# Patient Record
Sex: MALE
[De-identification: names, ages, dates, MRNs, and addresses within clinical notes are randomized per-mention and may not be internally consistent; named-entity substitution may affect disease eponyms.]

---

## 2021-12-29 ENCOUNTER — HOSPITAL ENCOUNTER (OUTPATIENT)
Dept: HOSPITAL 97 - ER | Age: 66
Setting detail: OBSERVATION
LOS: 1 days | Discharge: HOME | End: 2021-12-30
Attending: HOSPITALIST | Admitting: HOSPITALIST
Payer: COMMERCIAL

## 2021-12-29 VITALS — OXYGEN SATURATION: 99 %

## 2021-12-29 VITALS — BODY MASS INDEX: 35.6 KG/M2

## 2021-12-29 DIAGNOSIS — Z82.3: ICD-10-CM

## 2021-12-29 DIAGNOSIS — R07.9: ICD-10-CM

## 2021-12-29 DIAGNOSIS — E05.00: ICD-10-CM

## 2021-12-29 DIAGNOSIS — R00.1: Primary | ICD-10-CM

## 2021-12-29 DIAGNOSIS — Z82.49: ICD-10-CM

## 2021-12-29 DIAGNOSIS — Z84.1: ICD-10-CM

## 2021-12-29 DIAGNOSIS — I48.0: ICD-10-CM

## 2021-12-29 DIAGNOSIS — I10: ICD-10-CM

## 2021-12-29 DIAGNOSIS — E03.2: ICD-10-CM

## 2021-12-29 DIAGNOSIS — Z83.6: ICD-10-CM

## 2021-12-29 DIAGNOSIS — Z83.3: ICD-10-CM

## 2021-12-29 DIAGNOSIS — Z20.822: ICD-10-CM

## 2021-12-29 LAB
ALBUMIN SERPL BCP-MCNC: 3.8 G/DL (ref 3.4–5)
ALP SERPL-CCNC: 120 U/L (ref 45–117)
ALT SERPL W P-5'-P-CCNC: 34 U/L (ref 12–78)
AST SERPL W P-5'-P-CCNC: 16 U/L (ref 15–37)
BUN BLD-MCNC: 17 MG/DL (ref 7–18)
GLUCOSE SERPLBLD-MCNC: 107 MG/DL (ref 74–106)
HCT VFR BLD CALC: 44.9 % (ref 39.6–49)
HDLC SERPL-MCNC: 35 MG/DL (ref 40–60)
INR BLD: 1.05
LDLC SERPL CALC-MCNC: 126 MG/DL (ref ?–130)
LYMPHOCYTES # SPEC AUTO: 1.3 K/UL (ref 0.7–4.9)
MAGNESIUM SERPL-MCNC: 2.2 MG/DL (ref 1.8–2.4)
NT-PROBNP SERPL-MCNC: 162 PG/ML (ref ?–125)
PMV BLD: 9.2 FL (ref 7.6–11.3)
POTASSIUM SERPL-SCNC: 4 MMOL/L (ref 3.5–5.1)
RBC # BLD: 5.24 M/UL (ref 4.33–5.43)
TROPONIN (EMERG DEPT USE ONLY): < 0.02 NG/ML (ref 0–0.04)
TROPONIN I: < 0.02 NG/ML (ref 0–0.04)

## 2021-12-29 PROCEDURE — 83735 ASSAY OF MAGNESIUM: CPT

## 2021-12-29 PROCEDURE — 80076 HEPATIC FUNCTION PANEL: CPT

## 2021-12-29 PROCEDURE — 71045 X-RAY EXAM CHEST 1 VIEW: CPT

## 2021-12-29 PROCEDURE — 80061 LIPID PANEL: CPT

## 2021-12-29 PROCEDURE — 99285 EMERGENCY DEPT VISIT HI MDM: CPT

## 2021-12-29 PROCEDURE — 85610 PROTHROMBIN TIME: CPT

## 2021-12-29 PROCEDURE — 85025 COMPLETE CBC W/AUTO DIFF WBC: CPT

## 2021-12-29 PROCEDURE — 36415 COLL VENOUS BLD VENIPUNCTURE: CPT

## 2021-12-29 PROCEDURE — 93306 TTE W/DOPPLER COMPLETE: CPT

## 2021-12-29 PROCEDURE — 84484 ASSAY OF TROPONIN QUANT: CPT

## 2021-12-29 PROCEDURE — 84443 ASSAY THYROID STIM HORMONE: CPT

## 2021-12-29 PROCEDURE — 80048 BASIC METABOLIC PNL TOTAL CA: CPT

## 2021-12-29 PROCEDURE — 83880 ASSAY OF NATRIURETIC PEPTIDE: CPT

## 2021-12-29 PROCEDURE — 93005 ELECTROCARDIOGRAM TRACING: CPT

## 2021-12-29 RX ADMIN — Medication SCH ML: at 21:40

## 2021-12-29 NOTE — EDPHYS
Physician Documentation                                                                           

 [facility]                                                                 

Name: Vinay Torres                                                                               

Age: 66 yrs                                                                                       

Sex: Male                                                                                         

: 1955                                                                                   

MRN: [id number]                                                                                   

Arrival Date: 2021                                                                          

Time: 09:08                                                                                       

Account#: [id number]                                                                            

Bed 24                                                                                            

Private MD:                                                                                       

ED Physician Ruben Rutherford                                                                       

HPI:                                                                                              

                                                                                             

09:45 This 66 yrs old Male presents to ER via Ambulatory with complaints of Chest Pain > 30   cp  

      y/o.                                                                                        

09:45 The patient presents with a history of heart racing.                                    cp  

09:45 Onset: The symptoms/episode began/occurred this morning, about 0630. Duration: The      cp  

      patient or guardian reports a single episode, that is still ongoing, but improving. The     

      pain does not radiate. Associated signs and symptoms: Pertinent positives: chest            

      tightness, Pertinent negatives: cough, fever, SOB, syncope, vomiting. Severity of pain:     

      in the emergency department the pain has improved.                                          

09:45 Patient reports taking prescribed Metoprolol and aspirin pta.                           cp  

                                                                                                  

Historical:                                                                                       

- Allergies:                                                                                      

09:37 No Known Allergies;                                                                     vg1 

- Home Meds:                                                                                      

09:37 [place] Thyroid Oral [Active]; Vitamin B-12 sublingual [Active]; Vitamin D3 Oral         vg1 

      [Active]; Metoprolol Tartrate Oral [Active];                                                

09:39 Lexapro Oral [Active];                                                                  vg1 

- PMHx:                                                                                           

09:37 graves disease; Hypertension; Hypothyroidism; Atrial fibrillation;                      vg1 

- PSHx:                                                                                           

09:37 Knee-Left; Shoulder-Right; Hernia repair;                                               vg1 

                                                                                                  

- Immunization history:: Client reports receiving the 2nd dose of the Covid vaccine.              

- Social history:: Smoking status: Patient denies any tobacco usage or history of.                

                                                                                                  

                                                                                                  

ROS:                                                                                              

09:50 Constitutional: Negative for body aches, chills, fever, poor PO intake.                 cp  

09:50 Cardiovascular: Positive for chest pain, palpitations, Negative for edema.              cp  

09:50 Eyes: Negative for injury, pain, redness, and discharge.                                cp  

09:50 ENT: Negative for ear pain, sore throat, difficulty swallowing, difficulty handling         

      secretions.                                                                                 

09:50 Respiratory: Negative for cough, shortness of breath, wheezing.                             

09:50 Abdomen/GI: Negative for abdominal pain, nausea, vomiting, and diarrhea.                    

09:50 Back: Negative for pain at rest, pain with movement.                                        

09:50 : Negative for urinary symptoms.                                                          

09:50 Neuro: Negative for altered mental status, dizziness, headache, syncope, weakness.          

09:50 All other systems are negative.                                                             

                                                                                                  

Exam:                                                                                             

09:40 ECG was reviewed by the Attending Physician.                                            cp  

09:55 Constitutional: The patient appears in no acute distress, alert, awake,                 cp  

      non-diaphoretic, non-toxic, well developed, well nourished.                                 

09:55 Head/Face:  Normocephalic, atraumatic.                                                  cp  

09:55 Eyes: Periorbital structures: appear normal, Conjunctiva: normal, no exudate, no            

      injection, Sclera: no appreciated abnormality, Lids and lashes: appear normal,              

      bilaterally.                                                                                

09:55 ENT: External ear(s): are unremarkable, Nose: is normal, Mouth: Lips: moist, Oral           

      mucosa: pink and intact, moist, Posterior pharynx: Airway: no evidence of obstruction,      

      patent.                                                                                     

09:55 Neck: ROM/movement: is normal, is supple, without pain, no range of motions limitations.    

09:55 Chest/axilla: Inspection: normal, Palpation: is normal, no crepitus, no tenderness.         

09:55 Cardiovascular: Rate: bradycardic, Rhythm: regular, Edema: is not appreciated, JVD: is      

      not appreciated.                                                                            

09:55 Respiratory: the patient does not display signs of respiratory distress,  Respirations:     

      normal, no use of accessory muscles, no retractions, labored breathing, is not present,     

      Breath sounds: are clear throughout, no decreased breath sounds, no stridor, no             

      wheezing.                                                                                   

09:55 Abdomen/GI: Inspection: abdomen appears normal, Bowel sounds: active, all quadrants,        

      Palpation: abdomen is soft and non-tender, in all quadrants.                                

09:55 Back: pain, is absent, ROM is normal.                                                       

                                                                                                  

Vital Signs:                                                                                      

09:34  / 77; Pulse 50; Resp 20; Temp 97.1; Pulse Ox 100% ; Weight 113.4 kg; Height 5    vg1 

      ft. 10 in. (177.80 cm); Pain 4/10;                                                          

16:02  / 68; Pulse 43; Resp 17 S; Pulse Ox 100% on R/A; Pain 0/10;                      jl7 

20:31  / 65; Pulse 66; Resp 17; Pulse Ox 99% ;                                          cg  

09:34 Body Mass Index 35.87 (113.40 kg, 177.80 cm)                                            vg1 

                                                                                                  

MDM:                                                                                              

14:13 Patient medically screened.                                                             cp  

14:22 The patient was not given aspirin in the Emergency Department. Patient reports taking   cp  

      aspirin within the past 24 hours. ED course: Patient denies any current chest pain.         

16:15 Data reviewed: vital signs, nurses notes, lab test result(s), EKG, radiologic studies,  cp  

      plain films.                                                                                

16:15 Differential diagnosis: abnormal EKG, acute myocardial infarction, arrythmia, stable    cp  

      angina, unstable angina. Test interpretation: by ED physician or midlevel provider:         

      ECG, plain radiologic studies. ED course: VS noted. Patient observed to have HR at 40       

      bpm on monitor. No current complaints of chest pain. Will admit for observation after       

      consult with DR Dupree.                                                                     

                                                                                                  

                                                                                             

09:38 Order name: Basic Metabolic Panel                                                         

                                                                                             

09:38 Order name: CBC with Diff; Complete Time: 10:17                                           

                                                                                             

11:04 Interpretation: Reviewed.                                                                 

                                                                                             

09:38 Order name: LFT's                                                                         

                                                                                             

09:38 Order name: Magnesium                                                                     

                                                                                             

09:38 Order name: NT PRO-BNP; Complete Time: 11:03                                              

                                                                                             

11:04 Interpretation: NT PRO-; Reviewed.                                                 

                                                                                             

09:38 Order name: PT-INR; Complete Time: 10:17                                                  

                                                                                             

09:38 Order name: Troponin (emerg Dept Use Only); Complete Time: 11:03                          

                                                                                             

11:05 Interpretation: Reviewed.                                                                 

                                                                                             

09:39 Order name: Basic Metabolic Panel; Complete Time: 11:03                                 EDMS

                                                                                             

11:03 Interpretation: Normal except: ; GLUC 107; GFR 62.                                  

                                                                                             

09:39 Order name: Liver (Hepatic) Function; Complete Time: 11:03                              EDMS

                                                                                             

11:03 Interpretation: Normal except: ; GLOB 3.8; A/G 1.0.                                

                                                                                             

09:39 Order name: Magnesium; Complete Time: 11:03                                             EDMS

                                                                                             

15:43 Order name: Troponin (emerg Dept Use Only)                                                

                                                                                             

16:37 Order name: SARS-COV-2 RT PCR (Document "Date of Onset" if Symptomatic)                 [facility] 

                                                                                             

16:37 Order name: SARS-COV-2 RT PCR                                                           EDMS

                                                                                             

09:30 Order name: EKG; Complete Time: 09:30                                                   vg1 

                                                                                             

09:30 Order name: EKG - Nurse/Tech; Complete Time: 09:48                                      vg1 

                                                                                             

09:38 Order name: XRAY Chest (1 view); Complete Time: 11:03                                   cp  

                                                                                             

11:05 Interpretation: Report reviewed.                                                          

                                                                                             

09:38 Order name: IV Saline Lock; Complete Time: 09:48                                        cp  

                                                                                             

09:38 Order name: Labs collected and sent; Complete Time: 09:48                               cp  

                                                                                             

09:38 Order name: O2 Per Protocol; Complete Time: 09:48                                       cp  

                                                                                             

09:38 Order name: O2 Sat Monitoring; Complete Time: 09:48                                     cp  

                                                                                             

15:43 Order name: EKG; Complete Time: 15:44                                                   cp  

                                                                                             

15:43 Order name: EKG - Nurse/Tech; Complete Time: 15:53                                      cp  

                                                                                             

15:44 Order name: Vital Signs; Complete Time: 16:03                                           cp  

                                                                                             

16:32 Order name: Diet Heart Healthy; Complete Time: 16:33                                    cp  

                                                                                                  

EC:40 Rate is 47 beats/min. Rhythm is regular. [place] interval is normal. QRS interval is normal. cp  

      QT interval is normal. Interpreted by me. Reviewed by me.                                   

                                                                                                  

Administered Medications:                                                                         

14:20 CANCELLED (Physician Discretion): Aspirin Chewable Tablet 324 mg PO once; 81 mg tablets cp  

      x 4                                                                                         

                                                                                                  

                                                                                                  

Disposition:                                                                                      

                                                                                             

08:13 Co-signature as Attending Physician, Ruben Rutherford MD I agree with the assessment and   kdr 

      plan of care.                                                                               

                                                                                                  

Disposition Summary:                                                                              

21 16:17                                                                                    

Hospitalization Ordered                                                                           

      Hospitalization Status: Observation                                                     cp  

      Provider: Britton Dupree cp  

      Location: Telemetry/MedSurg (observation)                                               cp  

      Condition: Stable                                                                       cp  

      Problem: new                                                                            cp  

      Symptoms: have improved                                                                 cp  

      Bed/Room Type: Standard                                                                 cp  

      Room Assignment: 213(21 19:57)                                                    cg  

      Diagnosis                                                                                   

        - Chest pain, unspecified                                                             cp  

        - Bradycardia, unspecified                                                            cp  

      Forms:                                                                                      

        - Medication Reconciliation Form                                                      cp  

        - SBAR form                                                                           cp  

Signatures:                                                                                       

Dispatcher [employer]                           EDMS                                                 

Ruben Rutherford MD MD kdr Page, Corey, PA PA   cp                                                   

Jade Matos RN RN cg Garcia, Victoria, RN                    RN   vg1                                                  

                                                                                                  

Corrections: (The following items were deleted from the chart)                                    

12/29                                                                                             

14:20 13:17 TROPONIN (EMERG DEPT USE ONLY)+[id number] ordered. EDMS                            EDMS

14: 14:14 Aspirin Chewable Tablet 324 mg PO once; 81 mg tablets x 4 ordered. cp             cp  

14:22 09:45 The patient or guardian reports chest pain that is located primarily in the       cp  

      anterior chest wall, cp                                                                     

19:57 16:17 cp                                                                                cg  

                                                                                                  

**************************************************************************************************

## 2021-12-29 NOTE — RAD REPORT
EXAM DESCRIPTION:  RAD - Chest Single View - 12/29/2021 10:49 am

 

CLINICAL HISTORY:  CHEST PAIN

 

COMPARISON:  February 2018

 

TECHNIQUE:  AP portable chest image was obtained 12/29/2021 10:49 am .

 

FINDINGS:  No peripheral mass or consolidation. Minimally prominent interstitial pattern matches comp
arison. No acute failure or volume overload. Heart and vasculature are normal. No measurable pleural 
effusion and no pneumothorax. No acute bony abnormality seen. No acute aortic findings suspected.

 

IMPRESSION:  No acute cardiopulmonary process.

 

No significant change from comparison study.

## 2021-12-29 NOTE — ER
Nurse's Notes                                                                                     

 [facility]                                                                 

Name: Vinay Torres                                                                               

Age: 66 yrs                                                                                       

Sex: Male                                                                                         

: 1955                                                                                   

MRN: [id number]                                                                                   

Arrival Date: 2021                                                                          

Time: 09:08                                                                                       

Account#: [id number]                                                                            

Bed 24                                                                                            

Private MD:                                                                                       

Diagnosis: Chest pain, unspecified;Bradycardia, unspecified                                       

                                                                                                  

Presentation:                                                                                     

                                                                                             

09:34 Chief complaint: Patient states: Midsternal chest pain that does not radiate began this vg1 

      morning around 0630; denies NV or h/a, states shortness of breath. States took '4 baby      

      aspirin this morning around 0830'. Coronavirus screen: Vaccine status: Patient reports      

      receiving the 2nd dose of the covid vaccine. Client denies travel out of the [place] in        

      the last 14 days. Ebola Screen: Patient negative for fever greater than or equal to         

      101.5 degrees Fahrenheit, and additional compatible Ebola Virus Disease symptoms.           

      Initial Sepsis Screen: Does the patient meet any 2 criteria? No. Patient's initial          

      sepsis screen is negative. Does the patient have a suspected source of infection? No.       

      Patient's initial sepsis screen is negative. Risk Assessment: Do you want to hurt           

      yourself or someone else? Patient reports no desire to harm self or others. Onset of        

      symptoms was 2021.                                                             

09:34 Method Of Arrival: Ambulatory                                                           vg1 

09:34 Acuity: REDD 2                                                                           vg1 

                                                                                                  

Triage Assessment:                                                                                

09:37 General: Appears in no apparent distress. uncomfortable, Behavior is calm, cooperative. vg1 

      Pain: Complains of pain in mid-sternal area Pain does not radiate. Cardiovascular:          

      Patient's skin is warm and dry.                                                             

                                                                                                  

Historical:                                                                                       

- Allergies:                                                                                      

09:37 No Known Allergies;                                                                     vg1 

- Home Meds:                                                                                      

09:37 [place] Thyroid Oral [Active]; Vitamin B-12 sublingual [Active]; Vitamin D3 Oral         vg1 

      [Active]; Metoprolol Tartrate Oral [Active];                                                

09:39 Lexapro Oral [Active];                                                                  vg1 

- PMHx:                                                                                           

09:37 graves disease; Hypertension; Hypothyroidism; Atrial fibrillation;                      vg1 

- PSHx:                                                                                           

09:37 Knee-Left; Shoulder-Right; Hernia repair;                                               vg1 

                                                                                                  

- Immunization history:: Client reports receiving the 2nd dose of the Covid vaccine.              

- Social history:: Smoking status: Patient denies any tobacco usage or history of.                

                                                                                                  

                                                                                                  

Screenin:02 Abuse screen: Denies threats or abuse. Denies injuries from another. Nutritional        jl7 

      screening: No deficits noted. Tuberculosis screening: No symptoms or risk factors           

      identified. Fall Risk IV access (20 points). Total Romero Fall Scale indicates No Risk       

      (0-24 pts).                                                                                 

                                                                                                  

Assessment:                                                                                       

16:02 Reassessment: Patient appears in no apparent distress at this time. Patient and/or      jl7 

      family updated on plan of care and expected duration. Pain level reassessed. Patient is     

      alert, oriented x 3, equal unlabored respirations, skin warm/dry/pink. Patient denies       

      pain at this time.                                                                          

                                                                                                  

Vital Signs:                                                                                      

09:34  / 77; Pulse 50; Resp 20; Temp 97.1; Pulse Ox 100% ; Weight 113.4 kg; Height 5    vg1 

      ft. 10 in. (177.80 cm); Pain 4/10;                                                          

16:02  / 68; Pulse 43; Resp 17 S; Pulse Ox 100% on R/A; Pain 0/10;                      jl7 

20:31  / 65; Pulse 66; Resp 17; Pulse Ox 99% ;                                          cg  

09:34 Body Mass Index 35.87 (113.40 kg, 177.80 cm)                                            vg1 

                                                                                                  

ED Course:                                                                                        

09:08 Patient arrived in ED.                                                                  am2 

09:37 Triage completed.                                                                       vg1 

09:37 Malcolm Arteaga PA is PHCP.                                                                cp  

09:37 Ruben Rutherford MD is Attending Physician.                                              cp  

09:37 Arm band placed on. EKG completed in triage. Results shown to MD.                       vg1 

09:48 Basic Metabolic Panel Sent.                                                             jl7 

09:48 LFT's Sent.                                                                             jl7 

09:48 Magnesium Sent.                                                                         jl7 

09:49 Initial lab(s) drawn, by me, sent to lab. Inserted saline lock: 20 gauge in right       jl7 

      wrist, using aseptic technique. Blood collected. Patient maintains SpO2 saturation          

      greater than 95% on room air.                                                               

10:49 XRAY Chest (1 view) In Process Unspecified.                                             EDMS

15:53 Michelle Mobley, RN is Primary Nurse.                                                      jl7 

16:02 Patient has correct armband on for positive identification. Bed in low position. Call   [facility] 

      light in reach. Side rails up X 1. Cardiac monitor on. Pulse ox on. NIBP on.                

16:16 Britton Dupree is Hospitalizing Provider.                                               cp  

17:31 COVID swab sent to lab.                                                                 jl7 

                                                                                                  

Administered Medications:                                                                         

14:20 CANCELLED (Physician Discretion): Aspirin Chewable Tablet 324 mg PO once; 81 mg tablets cp  

      x 4                                                                                         

                                                                                                  

                                                                                                  

Outcome:                                                                                          

16:17 Decision to Hospitalize by Provider.                                                    cp  

20:52 Patient left the ED.                                                                    bb  

                                                                                                  

Signatures:                                                                                       

Dispatcher [employer]                           EDMS                                                 

Tonya Sullivan RN                     RN   bb                                                   

Malcolm Arteaga PA                         PA   cp                                                   

Jade Matos, RN                       RN                                                      

Michelle Mobley RN                        RN   jl7                                                  

Dottie Hamlin Victoria RN                    RN   vg1                                                  

                                                                                                  

**************************************************************************************************

## 2021-12-29 NOTE — XMS REPORT
Continuity of Care Document

                          Created on:2021



Patient:WILLIAM THOMAS

Sex:Male

:1955

External Reference #:[id number]





Demographics







                          Address                   [address] [place] [address]

 

                          Home Phone                [phone]

 

                          Work Phone                [phone]

 

                          Email Address             [email or web address]

 

                          Preferred Language        English

 

                          Marital Status            Unknown

 

                          [employer] Affiliation     Unknown

 

                          Race                      Unknown

 

                          Ethnic Group              Unknown









Author







                          Organization              [facility]

t

 

                          Address                   [address]



                                                    [place] [address]

 

                          Phone                     [phone]









Support







                Name            Relationship    Address         Phone

 

                NONE            Unavailable      [place]  [phone]



                                                [place] [address] 

 

                NONE            Unavailable      [place]  [phone]



                                                [place] [address] 









Care Team Providers







                    Name                Role                Phone

 

                    Iam             Attending Clinician Unavailable









Payers







           Payer Name Policy Type Policy Number Effective Date Expiration Date S

ource







Problems

This patient has no known problems.



Allergies, Adverse Reactions, Alerts

This patient has no known allergies or adverse reactions.



Medications

This patient has no known medications.



Procedures

This patient has no known procedures.



Encounters







        Start   End     Encounter Admission Attending Care    Care    Encounter 

Source



        Date/Time Date/Time Type    Type    Clinicians Facility Department ID   

   

 

        2021         Inpatient JENNIFER      Iam HCATO   SURG    [id number]

 [facility]



        16:00:00                         [place]                 [address]   [place]



                                                                        [facility]



                                                                        dic



                                                                        Hospita



                                                                        l

 

        2021         Inpatient JENNIFER VitaleERNSTTO   SURG    [id number]

 [facility]



        13:30:00                         [place]                 [address]   [place]



                                                                        [facility]



                                                                        dic



                                                                        Hospita



                                                                        l







Results

This patient has no known results.

## 2021-12-29 NOTE — P.HP
Certification for Inpatient


Patient admitted to: Observation


With expected LOS: <2 Midnights


Practitioner: I am a practitioner with admitting privileges, knowledge of 

patient current condition, hospital course, and medical plan of care.


Services: Services provided to patient in accordance with Admission requirements

found in Title 42 Section 412.3 of the Code of Federal Regulations





Patient History


Date of Service: 12/29/21


Reason for admission: Chest tightness.


History of Present Illness: 


66-year-old gentleman with a history of atrial fibrillation on metoprolol, not 

anticoagulated presented to the emergency department with a complaint of chest 

tightness of onset this morning.  Symptoms associated with shortness of breath 

and preceded by palpitation.  Patient denies any diaphoresis or nausea.  He 

denies any abdominal pain, denies any cough.  Patient noted to be bradycardic in

the ED. EKG demonstrated sinus bradycardia, no heart block.  He is on metoprolol

25 mg twice daily and took his morning dose.  His heart rate was in the low 40s 

during my assessment in the ED. Patient currently asymptomatic.  He is placed 

under observation for ACS rule out and further assessment of the bradycardia.





Allergies





No Known Allergies Allergy (Verified 02/02/18 01:32)


   





Home Medications: 








Aspirin Chewable [Aspirin Chewable*] 81 mg PO DAILY #30 tab.chew 02/02/18 


Levothyroxine [Synthroid] 75 mcg PO [id number] #30 tab 02/02/18 


Metoprolol Tartrate 25 mg PO BID #60 tablet 02/02/18 








- Past Medical/Surgical History


Diabetic: No


-: Graves disease


-: Atrial fibrillation


-: Iatrogenic hypothyroidism


-: Hernia repair


-: hemorrhodectomy





- Family History


  ** Father


-: Heart disease





  ** Mother


-: Lung disease, Diabetes, Stroke, Kidney disease





- Social History


Alcohol use: No


CD- Drugs: No


Caffeine use: Yes





Review of Systems


Other: 


Except as documented, all other systems reviewed and negative.








Physical Examination





- Physical Exam


General: Alert, In no apparent distress, Oriented x3


HEENT: Atraumatic, PERRLA, Mucous membr. moist/pink, Sclerae nonicteric


Neck: Supple, JVD not distended


Respiratory: Clear to auscultation bilaterally, Normal air movement


Cardiovascular: No edema, Regular rate/rhythm, Normal S1 S2, No murmurs


Gastrointestinal: Normal bowel sounds, Soft and benign, Non-distended, No 

tenderness


Musculoskeletal: No swelling


Integumentary: No rashes


Neurological: Normal speech, Normal strength at 5/5 x4 extr, Cranial nerves 3-12

 intact


Lymphatics: No axilla or inguinal lymphadenopathy





- Studies


Laboratory Data (last 24 hrs)





12/29/21 09:40: PT 12.1, INR 1.05


12/29/21 09:40: WBC 6.10, Hgb 15.1, Hct 44.9, Plt Count 153


12/29/21 09:40: Sodium 141, Potassium 4.0, BUN 17, Creatinine 1.18, Glucose 107 

H, Magnesium 2.2, Total Bilirubin 0.7, AST 16, ALT 34, Alkaline Phosphatase 120 

H








Assessment and Plan





- Problems (Diagnosis)


(1) Sinus bradycardia


Current Visit: Yes   Status: Acute   





(2) Chest pain


Current Visit: Yes   Status: Acute   





(3) Hypertension


Current Visit: No   Status: Acute   





(4) Hypothyroidism


Current Visit: No   Status: Acute   





(5) Paroxysmal atrial fibrillation


Onset Date: 02/02/18   Current Visit: No   Status: Acute   





- Plan


Place patient under observation.


Trend troponin


Hold home dose metoprolol


Obtain echocardiogram


Check TSH for levothyroxine dose adjustment.


Cardiology consult.


Collect, confirm and reconcile home medications.





- Advance Directives


Does patient have a Living Will: No


Does patient have a Durable POA for Healthcare: No

## 2021-12-30 VITALS — SYSTOLIC BLOOD PRESSURE: 156 MMHG | DIASTOLIC BLOOD PRESSURE: 70 MMHG | TEMPERATURE: 97.9 F

## 2021-12-30 LAB
BUN BLD-MCNC: 24 MG/DL (ref 7–18)
GLUCOSE SERPLBLD-MCNC: 102 MG/DL (ref 74–106)
HCT VFR BLD CALC: 41.5 % (ref 39.6–49)
LYMPHOCYTES # SPEC AUTO: 1.7 K/UL (ref 0.7–4.9)
PMV BLD: 9.4 FL (ref 7.6–11.3)
POTASSIUM SERPL-SCNC: 4.3 MMOL/L (ref 3.5–5.1)
RBC # BLD: 4.8 M/UL (ref 4.33–5.43)

## 2021-12-30 RX ADMIN — Medication SCH ML: at 08:25

## 2021-12-30 NOTE — P.DS
Admission Date: 12/29/21


Discharge Date: 12/30/21


Disposition: ROUTINE DISCHARGE


Discharge Condition: FAIR


Reason for Admission: Chest tightness.


Consultations: 


Cardiology-Dr. Beck.








- Problems


(1) Sinus bradycardia


Current Visit: Yes   Status: Acute   





(2) Chest pain


Current Visit: Yes   Status: Acute   





(3) Hypertension


Current Visit: No   Status: Acute   





(4) Hypothyroidism


Current Visit: No   Status: Acute   





(5) Paroxysmal atrial fibrillation


Onset Date: 02/02/18   Current Visit: No   Status: Acute   


Brief History of Present Illness: 


66-year-old gentleman with a history of atrial fibrillation on metoprolol, not 

anticoagulated presented to the emergency department with a complaint of chest 

tightness of onset this morning.  Symptoms associated with shortness of breath 

and preceded by palpitation.  Patient denies any diaphoresis or nausea.  He 

denies any abdominal pain, denies any cough.  Patient noted to be bradycardic in

the ED. EKG demonstrated sinus bradycardia, no heart block.  He is on metoprolol

25 mg twice daily and took his morning dose.  His heart rate was in the low 40s 

during my assessment in the ED. Patient currently asymptomatic.  He is placed 

under observation for ACS rule out and further assessment of the bradycardia.





Hospital Course: 


Patient placed under observation on the medical floor.  Troponin trended 

negative.  Patient evaluated by cardiology-Dr. Beck who recommended outpatient

stress test.  Patient was still bradycardic despite holding his home dose 

metoprolol.  He was in sinus rhythm.  Echocardiogram ordered and the result is 

pending.  TSH within normal limit. ACS ruled out, vitals are stable.  Patient is

deemed stable for discharge.  He will follow with Dr. Beck as an outpatient 

for stress test.





Vital Signs/Physical Exam: 














Temp Pulse Resp BP Pulse Ox


 


 97.8 F   50   18   130/63   98 


 


 12/30/21 08:00  12/30/21 08:00  12/30/21 08:00  12/30/21 08:00  12/30/21 08:00








General: Alert, In no apparent distress, Oriented x3


HEENT: Mucous membr. moist/pink


Neck: Supple, JVD not distended


Respiratory: Clear to auscultation bilaterally, Normal air movement


Cardiovascular: No edema, Normal S1 S2, Other (Bradycardia)


Gastrointestinal: Normal bowel sounds, Soft and benign, Non-distended, No 

tenderness


Musculoskeletal: No swelling


Integumentary: No rashes


Neurological: Normal strength at 5/5 x4 extr


Laboratory Data at Discharge: 














WBC  8.10 K/uL (4.3-10.9)  D 12/30/21  04:22    


 


Hgb  13.7 g/dL (13.6-17.9)   12/30/21  04:22    


 


Hct  41.5 % (39.6-49.0)   12/30/21  04:22    


 


Plt Count  154 K/uL (152-406)   12/30/21  04:22    


 


PT  12.1 SECONDS (9.5-12.5)   12/29/21  09:40    


 


INR  1.05   12/29/21  09:40    


 


Sodium  143 mmol/L (136-145)   12/30/21  04:22    


 


Potassium  4.3 mmol/L (3.5-5.1)   12/30/21  04:22    


 


BUN  24 mg/dL (7-18)  H  12/30/21  04:22    


 


Creatinine  1.44 mg/dL (0.55-1.3)  H  12/30/21  04:22    


 


Glucose  102 mg/dL ()   12/30/21  04:22    


 


Magnesium  2.2 mg/dL (1.8-2.4)   12/29/21  09:40    


 


Total Bilirubin  0.7 mg/dL (0.2-1.0)   12/29/21  09:40    


 


AST  16 U/L (15-37)   12/29/21  09:40    


 


ALT  34 U/L (12-78)   12/29/21  09:40    


 


Alkaline Phosphatase  120 U/L ()  H  12/29/21  09:40    


 


Troponin I  < 0.02 ng/mL (0.0-0.045)   12/30/21  00:41    


 


Triglycerides  280 mg/dL (<150)  H  12/29/21  21:21    


 


Cholesterol  217 mg/dL (<200)  H  12/29/21  21:21    


 


HDL Cholesterol  35 mg/dL (40-60)  L  12/29/21  21:21    


 


Cholesterol/HDL Ratio  6.20   12/29/21  21:21    








Home Medications: 








Escitalopram [Lexapro*] 0.5 tab PO DAILY 12/29/21 


Thyroid,Pork [[place] Thyroid] 1 tab PO DAILY 12/29/21 


Aspirin [Aspirin EC 81 MG] 81 mg PO DAILY #30 tablet. 12/30/21 





New Medications: 


Aspirin [Aspirin EC 81 MG] 81 mg PO DAILY #30 tablet.


Physician Discharge Instructions: 


Please call Dr. Beck's office at [phone] for arrangement for outpatient 

stress test.


Diet: AHA


Activity: Ad maykel


Followup: 


Ritchie Benavides DO [Primary Care Provider] - 1-2 Weeks


Isael Beck MD [ACTIVE - CAN ADMIT] - 1 Week (Outpatient stress test)

## 2022-01-03 NOTE — ECHO
HEIGHT: 5 ft 10 in   WEIGHT: 248 lb 14.4 oz   DATE OF STUDY: 12/30/2021   REFER DR: 
huma bass

2-DIMENSIONAL: YES

     M.MODE: YES

 DOPPLER: YES

COLOR FLOW: YES



                    TDS:  

PORTABLE: 

 DEFINITY:  

BUBBLE STUDY: 





DIAGNOSIS:  SYMPTOMATIC BRADYCARDIA



CARDIAC HISTORY:  

CATHERIZATION: 

SURGERY: 

PROSTHETIC VALVE: 

PACEMAKER: 





MEASUREMENTS (cm)

    DIASTOLIC (NORMALS)                 SYSTOLIC (NORMALS)

IVSd                 1.1 (0.6-1.2)                    LA Diam 3.8 (1.9-4.0)     LVEF       
  60-65%  

LVIDd               5.5 (3.5-5.7)                        LVIDs      3.2 (2.0-3.5)     %FS  
        43%

LVPWd             1.2 (0.6-1.2)

Ao Diam           3.2 (2.0-3.7)



2 DIMENSIONAL ASSESSMENT:

RIGHT ATRIUM:                   NORMAL

LEFT ATRIUM:       NORMAL



RIGHT VENTRICLE:            NORMAL

LEFT VENTRICLE: NORMAL



TRICUSPID VALVE:             NORMAL

MITRAL VALVE:     MILD MITRAL REGURGITATION



PULMONIC VALVE:             NORMAL

AORTIC VALVE:     NORMAL



PERICARDIAL EFFUSION: NONE

AORTIC ROOT:      NORMAL





LEFT VENTRICULAR WALL MOTION:     NORMAL



DOPPLER/COLOR FLOW:     MILD MITRAL REGURGITATION



COMMENTS:      NORMAL LEFT VENTRICULAR EJECTION FRACTION 60-65%.  

                            NORMAL WALL MOTION.  MILD MITRAL REGURGITATION.



TECHNOLOGIST:   LOPEZ STONER

## 2023-02-09 ENCOUNTER — HOSPITAL ENCOUNTER (EMERGENCY)
Dept: HOSPITAL 97 - ER | Age: 68
Discharge: HOME | End: 2023-02-09
Payer: COMMERCIAL

## 2023-02-09 DIAGNOSIS — R00.2: Primary | ICD-10-CM

## 2023-02-09 DIAGNOSIS — R07.89: ICD-10-CM

## 2023-02-09 DIAGNOSIS — Z20.822: ICD-10-CM

## 2023-02-09 DIAGNOSIS — E03.9: ICD-10-CM

## 2023-02-09 DIAGNOSIS — I48.91: ICD-10-CM

## 2023-02-09 DIAGNOSIS — I10: ICD-10-CM

## 2023-02-09 DIAGNOSIS — Z79.82: ICD-10-CM

## 2023-02-09 LAB
BUN BLD-MCNC: 22 MG/DL (ref 7–18)
GLUCOSE SERPLBLD-MCNC: 115 MG/DL (ref 74–106)
HCT VFR BLD CALC: 43.2 % (ref 39.6–49)
LYMPHOCYTES # SPEC AUTO: 1.5 K/UL (ref 0.7–4.9)
MCV RBC: 86.1 FL (ref 80–100)
NT-PROBNP SERPL-MCNC: 22 PG/ML (ref ?–125)
PMV BLD: 8.7 FL (ref 7.6–11.3)
POTASSIUM SERPL-SCNC: 4 MMOL/L (ref 3.5–5.1)
RBC # BLD: 5.01 M/UL (ref 4.33–5.43)
TROPONIN I SERPL HS-MCNC: 7.7 PG/ML (ref ?–58.9)

## 2023-02-09 PROCEDURE — 96374 THER/PROPH/DIAG INJ IV PUSH: CPT

## 2023-02-09 PROCEDURE — 93005 ELECTROCARDIOGRAM TRACING: CPT

## 2023-02-09 PROCEDURE — 80048 BASIC METABOLIC PNL TOTAL CA: CPT

## 2023-02-09 PROCEDURE — 84484 ASSAY OF TROPONIN QUANT: CPT

## 2023-02-09 PROCEDURE — 36415 COLL VENOUS BLD VENIPUNCTURE: CPT

## 2023-02-09 PROCEDURE — 87811 SARS-COV-2 COVID19 W/OPTIC: CPT

## 2023-02-09 PROCEDURE — 99285 EMERGENCY DEPT VISIT HI MDM: CPT

## 2023-02-09 PROCEDURE — 83880 ASSAY OF NATRIURETIC PEPTIDE: CPT

## 2023-02-09 PROCEDURE — 85025 COMPLETE CBC W/AUTO DIFF WBC: CPT

## 2023-02-09 PROCEDURE — 71045 X-RAY EXAM CHEST 1 VIEW: CPT

## 2023-02-09 NOTE — RAD REPORT
EXAM DESCRIPTION:  RAD - Chest Single View - 2/9/2023 7:49 pm

 

CLINICAL HISTORY:  Chest pain

Chest pain.

 

COMPARISON:  Chest Single View dated 12/29/2021; Chest Single View dated 2/2/2018; CHEST PA AND LAT 2
 VIEW dated 7/12/2013; CHEST SINGLE VIEW dated 12/8/2011

 

FINDINGS:  Portable technique limits examination quality.

 

The lungs are grossly clear. The heart is normal in size. No displaced fractures.

 

IMPRESSION:  No acute intrathoracic process suspected.

## 2023-02-09 NOTE — EDPHYS
Physician Documentation                                                                           

 [facility]                                                                 

Name: Vinay Torres                                                                               

Age: 67 yrs                                                                                       

Sex: Male                                                                                         

: 1955                                                                                   

MRN: [id number]                                                                                   

Arrival Date: 2023                                                                          

Time: 19:07                                                                                       

Account#: [id number]                                                                            

Bed 4                                                                                             

Private MD: Ritchie Benavides ED Physician Javon Neal                                                                         

HPI:                                                                                              

                                                                                             

19:55 This 67 yrs old Male presents to ER via Ambulatory with complaints of palpitations,     rn  

      Chest Pain.                                                                                 

19:55 The patient or guardian reports chest pain that is located primarily in the chest       rn  

      diffusely. Onset: just prior to arrival. The pain does not radiate. Associated signs        

      and symptoms: Pertinent positives: palpitations, shortness of breath, Pertinent             

      negatives: abdominal pain, cough, diaphoresis, syncope, vomiting. The chest pain is         

      described as aching. Duration: The patient or guardian reports a single episode, that       

      is now resolved. Modifying factors: The symptoms are alleviated by nothing. the             

      symptoms are aggravated by nothing. Severity of pain: At its worst the pain was mild in     

      the emergency department the pain has resolved. The patient has experienced similar         

      episodes in the past. The patient has not recently seen a physician. Pt reports             

      palpitations while at home walking from shop, assoc with sob and chest pain that was        

      mild, resolved upon arrival to ER. Reports told by Dr. Pineda to stop metoprolol           

      because HR too low. Currently asymptomatic. .                                               

                                                                                                  

Historical:                                                                                       

- Allergies:                                                                                      

19:26 No Known Allergies;                                                                     mb9 

- Home Meds:                                                                                      

19:26 Lexapro Oral [Active]; levothyroxine oral [Active]; Aspirin Oral [Active];              mb9 

- PMHx:                                                                                           

19:26 Atrial fibrillation; graves disease; Hypertension; Hypothyroidism;                      mb9 

- PSHx:                                                                                           

19:26 hernia repair; Knee-Left; Shoulder-Right;                                               mb9 

                                                                                                  

- Immunization history:: Adult Immunizations up to date.                                          

- Social history:: Smoking status: Patient denies any tobacco usage or history of.                

- Family history:: not pertinent.                                                                 

- Hospitalizations: : No recent hospitalization is reported.                                      

                                                                                                  

                                                                                                  

ROS:                                                                                              

19:55 Constitutional: Negative for fever, chills, and weight loss, Eyes: Negative for injury, rn  

      pain, redness, and discharge, Neck: Negative for injury, pain, and swelling,                

      Cardiovascular: + chest pain and palpitations Respiratory: + sob Abdomen/GI: Negative       

      for abdominal pain, nausea, vomiting, diarrhea, and constipation, MS/Extremity:             

      Negative for injury and deformity, Skin: Negative for injury, rash, and discoloration,      

      Neuro: Negative for headache, weakness, numbness, tingling, and seizure.                    

                                                                                                  

Exam:                                                                                             

19:55 Constitutional:  This is a well developed, well nourished patient who is awake, alert,  rn  

      and in no acute distress. Head/Face:  Normocephalic, atraumatic. Eyes:  Periorbital         

      areas with no swelling, redness, or edema. Cardiovascular:  Regular rate and rhythm.        

      No pulse deficits. Respiratory:  No increased work of breathing, no retractions or          

      nasal flaring. Abdomen/GI:  soft, non-tender Skin:  Warm, dry MS/ Extremity:  Pulses        

      equal, no cyanosis.  Neuro:  Awake and alert, GCS 15                                        

21:11 ECG was reviewed by the Attending Physician.                                            rn  

                                                                                                  

Vital Signs:                                                                                      

19:24  / 74; Pulse 73; Resp 18; Temp 98.8; Pulse Ox 99% on R/A; Weight 117.93 kg;       mb9 

      Height 5 ft. 10 in. (177.80 cm);                                                            

20:30  / 57; Pulse 56; Resp 16; Pulse Ox 98% ;                                          vc1 

19:24 Body Mass Index 37.31 (117.93 kg, 177.80 cm)                                            mb9 

                                                                                                  

MDM:                                                                                              

19:12 Patient medically screened.                                                             rn  

20:42 Differential diagnosis: acute myocardial infarction, acute pericarditis, anxiety,       rn  

      coronary artery disease pericarditis, pneumothorax, stable angina, afib with rvr. HEART     

      Score: History: Slightly Suspicious (0), ECG: Normal (0), Age: > or = 65 years (2),         

      Risk Factors: 1 or 2 risk factors (1), Troponin: < or = 1 x Normal Limit (0), Total         

      Score = 3. Data reviewed: vital signs, nurses notes, lab test result(s), EKG,               

      radiologic studies, plain films, and as a result, I will discharge patient. Counseling:     

      I had a detailed discussion with the patient and/or guardian regarding: the historical      

      points, exam findings, and any diagnostic results supporting the discharge/admit            

      diagnosis, lab results, radiology results, the need for outpatient follow up, to return     

      to the emergency department if symptoms worsen or persist or if there are any questions     

      or concerns that arise at home. Special discussion: I discussed with the                    

      patient/guardian in detail that at this point there is no indication for admission to       

      the hospital. It is understood, however, that if the symptoms persist or worsen the         

      patient needs to return immediately for re-evaluation. Based on the history and exam        

      findings, there is no indication for further emergent testing or inpatient evaluation.      

      I discussed with the patient/guardian the need to see the cardiologist for further          

      evaluation of the symptoms. ED course: Pt resolved prior to coming in after taking          

      metoprolol at home, palpitations/CP/sob coincided together and all resolved together,       

      pointing more to afib with rvr as root cause. Trop neg here. Still asymptomatic. Will       

      dc home with f/u with Dr. Pineda. Takes aspirin daily already. .                           

                                                                                                  

                                                                                             

19:22 Order name: Basic Metabolic Panel; Complete Time: 20:36                                 rn  

                                                                                             

19:22 Order name: CBC with Diff; Complete Time: 20:03                                         rn  

                                                                                             

19:22 Order name: NT PRO-BNP; Complete Time: 20:36                                            rn  

                                                                                             

19:22 Order name: Troponin HS; Complete Time: 20:36                                           rn  

                                                                                             

19:22 Order name: XRAY Chest (1 view); Complete Time: 20:03                                   rn  

                                                                                             

19:34 Order name: SARS RAPID; Complete Time: 20:36                                            kl  

                                                                                             

19:22 Order name: EKG; Complete Time: 19:23                                                   rn  

                                                                                             

19:22 Order name: Cardiac monitoring; Complete Time: 19:37                                    rn  

                                                                                             

19:22 Order name: EKG - Nurse/Tech; Complete Time: 19:56                                      rn  

                                                                                             

19:22 Order name: IV Saline Lock; Complete Time: 19:37                                        rn  

                                                                                             

19:22 Order name: Labs collected and sent; Complete Time: 19:37                               rn  

                                                                                             

19:22 Order name: O2 Per Protocol; Complete Time: 19:37                                       rn  

                                                                                             

19:22 Order name: O2 Sat Monitoring; Complete Time: 19:37                                     rn  

                                                                                                  

EC:11 Rate is 62 beats/min. Rhythm is regular. QRS Axis is Normal. NM interval is normal. QRS rn  

      interval is normal. QT interval is normal. No Q waves. T waves are Normal. No ST            

      changes noted. Clinical impression: Normal ECG. Interpreted by me. Reviewed by me.          

                                                                                                  

Administered Medications:                                                                         

20:15 Drug: Magnesium Sulfate 1 grams Route: IVPB; Infused Over: 1 hrs; Site: left            jb4 

      antecubital;                                                                                

21:09 Follow up: Response: No adverse reaction                                                jb4 

                                                                                                  

                                                                                                  

Disposition Summary:                                                                              

23 20:44                                                                                    

Discharge Ordered                                                                                 

      Location: Home                                                                          rn  

      Problem: new                                                                            rn  

      Symptoms: are resolved                                                                  rn  

      Condition: Stable                                                                       rn  

      Diagnosis                                                                                   

        - Paroxysmal atrial fibrillation - Resolved                                           rn  

        - Palpitations                                                                        rn  

        - Chest pain, unspecified                                                             rn  

      Followup:                                                                               rn  

        - With: Private Physician                                                                  

        - When: As needed                                                                          

        - Reason: Recheck today's complaints, Re-evaluation by your physician                      

      Discharge Instructions:                                                                     

        - Discharge Summary Sheet                                                             rn  

        - Atrial Fibrillation                                                                 rn  

        - Nonspecific Chest Pain, Adult                                                       rn  

        - Palpitations                                                                        rn  

      Forms:                                                                                      

        - Medication Reconciliation Form                                                      rn  

        - Thank You Letter                                                                    rn  

        - Antibiotic Education                                                                rn  

        - Prescription Opioid Use                                                             rn  

Signatures:                                                                                       

Dispatcher [employer]                           Javon Solis MD MD rn Bryson, James RN                       RN   jb4                                                  

Kay Stratton, RN                 RN   mb9                                                  

                                                                                                  

**************************************************************************************************

## 2023-02-09 NOTE — ER
Nurse's Notes                                                                                     

 [facility]                                                                 

Name: Vinay Torres                                                                               

Age: 67 yrs                                                                                       

Sex: Male                                                                                         

: 1955                                                                                   

MRN: [id number]                                                                                   

Arrival Date: 2023                                                                          

Time: 19:07                                                                                       

Account#: [id number]                                                                            

Bed 4                                                                                             

Private MD: Ritchie Benavides                                                                        

Diagnosis: Paroxysmal atrial fibrillation-Resolved;Palpitations;Chest pain, unspecified           

                                                                                                  

Presentation:                                                                                     

                                                                                             

19:24 Chief complaint: Patient states: "I started having chest pain at 6 today. My heart rate mb9 

      went up to 110 and then down to 50, which is where i usually am". Coronavirus screen:       

      Vaccine status: Patient reports receiving the 2nd dose of the covid vaccine. Ebola          

      Screen: No symptoms or risks identified at this time. Initial Sepsis Screen: Does the       

      patient meet any 2 criteria? No. Patient's initial sepsis screen is negative. Does the      

      patient have a suspected source of infection? No. Patient's initial sepsis screen is        

      negative. Risk Assessment: Do you want to hurt yourself or someone else? Patient            

      reports no desire to harm self or others. Onset of symptoms was 2023.          

19:24 Acuity: REDD 3                                                                           mb9 

19:24 Method Of Arrival: Ambulatory                                                           mb9 

                                                                                                  

Historical:                                                                                       

- Allergies:                                                                                      

19:26 No Known Allergies;                                                                     mb9 

- Home Meds:                                                                                      

19:26 Lexapro Oral [Active]; levothyroxine oral [Active]; Aspirin Oral [Active];              mb9 

- PMHx:                                                                                           

19:26 Atrial fibrillation; graves disease; Hypertension; Hypothyroidism;                      mb9 

- PSHx:                                                                                           

19:26 hernia repair; Knee-Left; Shoulder-Right;                                               mb9 

                                                                                                  

- Immunization history:: Adult Immunizations up to date.                                          

- Social history:: Smoking status: Patient denies any tobacco usage or history of.                

- Family history:: not pertinent.                                                                 

- Hospitalizations: : No recent hospitalization is reported.                                      

                                                                                                  

                                                                                                  

Screenin:05 [facility] ED Fall Risk Assessment (Adult) History of falling in the last 3 months,       vc1 

      including since admission No falls in past 3 months (0 pts) Confusion or Disorientation     

      No (0 pts) Intoxicated or Sedated No (0 pts) Impaired Gait No (0 pts) Mobility Assist       

      Device Used No (0 pt) Altered Elimination No (0 pt) Score/Fall Risk Level 0 - 2 = Low       

      Risk Oriented to surroundings, Maintained a safe environment, Educated pt \T\ family on     

      fall prevention, incl call for assistance when getting out of bed, Assessed \T\             

      reinforced patient's understanding of fall precautions, Provided non-skid footwear.         

      Abuse screen: Denies threats or abuse. Nutritional screening: No deficits noted.            

      Tuberculosis screening: No symptoms or risk factors identified.                             

                                                                                                  

Assessment:                                                                                       

19:30 General: Appears in no apparent distress. comfortable, Behavior is calm, cooperative,   jb4 

      appropriate for age. Pain: Complains of pain in chest Pain does not radiate. Pain           

      currently is 0 out of 10 on a pain scale. Quality of pain is described as tightness.        

      Neuro: Level of Consciousness is awake, alert, obeys commands, Oriented to person,          

      place, time, situation. Cardiovascular: Patient's skin is warm and dry. Respiratory:        

      Airway is patent Respiratory effort is even, unlabored, Respiratory pattern is regular,     

      symmetrical. GI: No signs and/or symptoms were reported involving the gastrointestinal      

      system. : No signs and/or symptoms were reported regarding the genitourinary system.      

      EENT: No signs and/or symptoms were reported regarding the EENT system. Derm: Skin is       

      intact, Skin is pink, warm \T\ dry. Musculoskeletal: Circulation, motion, and sensation     

      intact. Range of motion: intact in all extremities.                                         

20:30 Reassessment: Patient appears in no apparent distress at this time. Patient and/or      jb4 

      family updated on plan of care and expected duration. Pain level reassessed. Patient is     

      alert, oriented x 3, equal unlabored respirations, skin warm/dry/pink.                      

21:07 Reassessment: Patient appears in no apparent distress at this time. Patient and/or      vc1 

      family updated on plan of care and expected duration. Pain level reassessed. Patient is     

      alert, oriented x 3, equal unlabored respirations, skin warm/dry/pink. Patient states       

      feeling better. Patient states symptoms have improved. Pain: Denies pain. Pain began        

      suddenly.                                                                                   

                                                                                                  

Vital Signs:                                                                                      

19:24  / 74; Pulse 73; Resp 18; Temp 98.8; Pulse Ox 99% on R/A; Weight 117.93 kg;       mb9 

      Height 5 ft. 10 in. (177.80 cm);                                                            

20:30  / 57; Pulse 56; Resp 16; Pulse Ox 98% ;                                          vc1 

19:24 Body Mass Index 37.31 (117.93 kg, 177.80 cm)                                            mb9 

                                                                                                  

ED Course:                                                                                        

19:07 Patient arrived in ED.                                                                  am2 

19:07 Ritchie Benavides DO is Private Physician.                                                am2 

19:12 Javon Neal MD is Attending Physician.                                                rn  

19:26 Triage completed.                                                                       mb9 

19:26 Arm band placed on.                                                                     mb9 

19:27 Placed in gown. Bed in low position. Call light in reach. Side rails up X 1. Client     mb9 

      placed on continuous cardiac and pulse oximetry monitoring. NIBP monitoring applied.        

      Cardiac monitor on.                                                                         

19:30 Initial lab(s) drawn, by me, sent to lab. Inserted saline lock: 20 gauge in left        jb4 

      antecubital area, using aseptic technique. Blood collected. Patient maintains SpO2          

      saturation greater than 95% on room air.                                                    

19:33 Freddy Correa, RN is Primary Nurse.                                                     jb4 

19:51 XRAY Chest (1 view) In Process Unspecified.                                             EDMS

19:56 SARS RAPID Sent.                                                                        jb4 

21:06 No provider procedures requiring assistance completed. IV discontinued, intact,         vc1 

      bleeding controlled, No redness/swelling at site. Pressure dressing applied.                

                                                                                                  

Administered Medications:                                                                         

20:15 Drug: Magnesium Sulfate 1 grams Route: IVPB; Infused Over: 1 hrs; Site: left            jb4 

      antecubital;                                                                                

21:09 Follow up: Response: No adverse reaction                                                jb4 

                                                                                                  

                                                                                                  

Medication:                                                                                       

21:07 VIS not applicable for this client.                                                     vc1 

                                                                                                  

Outcome:                                                                                          

20:44 Discharge ordered by MD.                                                                rn  

21:06 Discharged to home ambulatory, with significant other.                                  vc1 

21:06 Condition: good                                                                             

21:06 Discharge instructions given to patient, significant other, Instructed on discharge         

      instructions, follow up and referral plans. Demonstrated understanding of instructions,     

      follow-up care.                                                                             

21:09 Patient left the ED.                                                                    vc1 

                                                                                                  

Signatures:                                                                                       

Dispatcher [employer]                           EDMS                                                 

Javon Neal MD MD rn Bryson, James, RN                       RN   jb4                                                  

Dottie Hamlin                               am2                                                  

Corinne Austin RN                    RN   vc1                                                  

Kay Stratton RN                 RN   mb9                                                  

                                                                                                  

**************************************************************************************************

## 2023-02-09 NOTE — XMS REPORT
Continuity of Care Document

                           Created on:2023



Patient:WILLIAM TORRES

Sex:Male

:1955

External Reference #:[id number]





Demographics







                          [place] [address]

 

                          Home Phone                [phone]

 

                          Work Phone                [phone]

 

                          Email Address             [email or web address]

 

                          Preferred Language        English

 

                          Marital Status            Unknown

 

                          Mormonism Affiliation     Unknown

 

                          Race                      Unknown

 

                          Additional Race(s)        Unavailable



                                                    White

 

                          Ethnic Group              Unknown









Author







                          Organization              [facility]

t

 

                          Address                   [address] [place] [address] [address]



                                                    [place] [address]

 

                          Phone                     [phone]









Support







                Name            Relationship    Address         Phone

 

                GUNNER TORRES            [address] [place] [address]  [phone]



                                                [place] [address] 

 

                GUNNER TORRESDINESH            [address] [place] [address]  [phone]



                                                [place] [address] 

 

                NONE, OTHER     OT               [place]  [phone]



                                                [place] [address] 

 

                WILLIAM TORRES SA               [place]  [phone]



                                                [place] [address] 

 

                Donato Torres  Spouse          [address] [place] [address] dr. [phone]

[phone]



                                                [place] [id number] 

 

                Samantha Torres Child           Unavailable     [phone]









Care Team Providers







                    Name                Role                Phone

 

                    Asked, No Pcp       Primary Care Physician Unavailable

 

                    Chad Vitale   Attending Clinician Unavailable

 

                    Elan Jc   Attending Clinician Unavailable

 

                    Elan Jc   Admitting Clinician Unavailable









Payers







           Payer Name Policy Type Policy Number Effective Date Expiration Date S

ource







Problems

This patient has no known problems.



Allergies, Adverse Reactions, Alerts







       Allergy Allergy Status Severity Reaction(s) Onset  Inactive Treating Comm

ents 

Source



       Name   Type                        Date   Date   Clinician        

 

       No Known DA     Active U                                   [facility]



       Allergie                                                     Clear



       s                                  00:00:                      [place]



                                          00                          [facility]







Social History







           Social Habit Start Date Stop Date  Quantity   Comments   Source

 

           Sex Assigned At 1955                       [facility]



           Birth      00:00:00   00:00:00                         









                Smoking Status  Start Date      Stop Date       Source

 

                Tobacco smoking consumption unknown                             

    [facility]







Medications

This patient has no known medications.



Procedures







                Procedure       Date / Time Performed Performing Clinician Obdulio paul

 

                [id number]         2022 00:00:00 [facility]

 

                [id number]         2022 00:00:00 [facility]

 

                [id number]         2022 00:00:00 [facility]







Plan of Care







             Planned Activity Planned Date Details      Comments     Source

 

             Future Scheduled 2022   COVID-19 VACCINE (#1)              [facility]



             Test         05:37:59     [code = COVID-19              



                                       VACCINE (#1)]              

 

             Future Scheduled 2022   COLONOSCOPY SCREENING              [facility]



             Test         05:37:59     [code = COLONOSCOPY              



                                       SCREENING]                

 

             Future Scheduled 2022   SHINGLES VACCINES (1              Met

[facility]



             Test         05:37:59     of 2) [code = SHINGLES              



                                       VACCINES (1 of 2)]              

 

             Future Scheduled 2022   65+ PNEUMOCOCCAL              Methodi

[facility]



             Test         05:37:59     VACCINE (1 - PCV)              



                                       [code = 65+               



                                       PNEUMOCOCCAL VACCINE              



                                       (1 - PCV)]                

 

             Future Scheduled 2022   INFLUENZA VACCINE              Method

[facility]



             Test         05:37:59     [code = INFLUENZA              



                                       VACCINE]                  







Encounters







        Start   End     Encounter Admission Attending Care    Care    Encounter 

Source



        Date/Time Date/Time Type    Type    Clinicians Facility Department ID   

   

 

        2021         Inpatient JENNIFER Vitale, [facility]   SUR[facility]    [id number]

 [facility]



        16:00:00                         Chad                 [address]      [place]



                                                                        [facility]



                                                                        [facility]



                                                                        l

 

        2022 Inpatient JENNIFER Jc, [facility]   S[facility]    [id number]

22 [facility]



        09:42:00 15:00:00                 Elan                 [address]      [place]



                                                                        [facility]



                                                                        [facility]



                                                                        [facility]osp[facility]



                                                                        l

 

        2022 Outpatient JENNIFER Jc, [facility]   3DAY    [id number]

[facility]



        09:00:00 23:00:00                 Elan                 [address]      [place]



                                                                        [facility]



                                                                        [facility]



                                                                        Hospita



                                                                        l

 

        2022 Outpatient         Babita, HCACL   LABO    [id number]

673 [facility]



        16:31:00 16:31:00                 Elan                 76      [facility]







Results







           Test Description Test Time  Test Comments Results    Result     [facility]

e



                                                       Comments   

 

           - XR PELVIS 2022            *********************          

  



           VIEWS      15:24:00              *********************            



                                            [facility] :            



                                            1955 Sex:            



                                            M********************            



                                            *********************            



                                            *******************            



                                            Patient Name:            



                                            WILLIAM TORRES Unit No:            



                                            [id number] EXAMS:            



                                            CPT CODE: [id number]            



                                            XR PELVIS 1/2 VIEWS            



                                            [address]308 INTRAOPERATIVE            



                                            LEG LENGTH FILM            



                                            COMMENT: COMPARISON:            



                                            No prior exams            



                                            available. In            



                                            progress right hip            



                                            replacement is noted.            



                                            ** Electronically            



                                            Signed by Teodoro Martinez MD ** ** on            



                                            2022 at 1524 **            



                                            Reported and signed            



                                            by: Teodoro Martinez MD CC: Elan Jc MD             



                                            Technologist: BUCK MALDONADO (RT.R)            



                                            Transcribed D/T:            



                                            2022 (1524)            



                                            [facility]            



                                            NAME: WILLIAM TORRES [address] [facility] PHYS: Elan Schmitt MD            



                                            : 1955 AGE:            



                                            66 SEX: M [place] [address] ACCT NO:            



                                            [id number] LOC:            



                                            Y.319 A PHONE #:            



                                            [phone]  EXAM            



                                            DATE: 2022            



                                            STATUS: ADM IN FAX #:            



                                            [phone] RAD #:            



                                            [id number] D/C DT PAGE            



                                            1 Signed Report            



                                            Patient Name:            



                                            WILLIAM TORRES Unit No:            



                                            [id number] EXAMS: CPT            



                                            CODE: [id number] XR            



                                            PELVIS 1/2 VIEWS            



                                            [address]328 (Continued)            



                                            Orig Print D/T: S:            



                                            2022 (1527)            



                                            [facility] NAME:            



                                            WILLIAM TORRES [address] [facility] PHYS: Elan Schmitt MD            



                                            : 1955 AGE:            



                                            66 SEX: M [place],            



                                            [place] [address] ACCT NO:            



                                            [id number] LOC:            



                                            Y.319 A PHONE #:            



                                            [phone] EXAM            



                                            DATE: 2022            



                                            STATUS: ADM IN FAX #:            



                                            [phone] RAD #:            



                                            [id number] D/C DT PAGE            



                                            2 Signed Report            









                    VITAMIN D 25-HYDROXY (TOTAL) 2022 07:16:00 









                      Test Item  Value      Reference Range Interpretation Comme

nts









             VITAMIN D 25-HYDROXY (TOTAL) 43.4 ng/mL   30.0-100.0               

 Vitamin D deficiency has 

been



             (test code = VITD25)                                        defined

 by the [place]



                                                                 ofMedicine and 

an Endocrine



                                                                 Society practic

e guideline as



                                                                 alevel of serum

 25-OH vitamin D



                                                                 less than 20 ng

/mL (1,2).The



                                                                 Endocrine Socie

ty went on to



                                                                 further define 

vitamin



                                                                 Dinsufficiency 

as a level between



                                                                 21 and 29 ng/mL

 (2).1. IOM



                                                                 ([place] of M

edicine). 2010.



                                                                 Dietary referen

ce intakes for



                                                                 calcium and D. 

[place]: The



                                                                 [employer]

ies Press.2.



                                                                 Damian CASAS, Shanti HUDSON,



                                                                 Bria waters HA, et al.



                                                                 Evaluation, curt

atment, and



                                                                 prevention of v

itamin D



                                                                 deficiency: an 

Endocrine Society



                                                                 clinical practi

ce guideline.



                                                                 JCEM. 2011;



                                                                 96(7):1911-30.P

erformed At: 



                                                                 [facility] [address]

[id number] Bart COLBY MD Ph:[phone]

8



PROTHROMBIN [id number] 12:09:00





             Test Item    Value        Reference Range Interpretation Comments

 

             PROTHROMBIN TIME 13.3 secs    9.7-12.5     H            Please note

 new normal



             PATIENT (test code =                                        range.



             PTP)                                                

 

             INTERNATIONAL NORMAL 1.20         <2.0                      RECOMME

NDED THERAPEUTIC



             RATIO (test code =                                        RANGE FOR

 ORAL



             INR)                                                ANTICOAGULANTTR

EATMENT:



                                                                 CONDITION INRPr

ophylaxis



                                                                 of venous throm

bosis in



                                                                 2.0 - 3.0 high-

risk



                                                                 medical or surg

ical



                                                                 patientsTreatme

nt of



                                                                 venous thrombos

is 2.0 -



                                                                 3.0Prevention o

f



                                                                 embolism 2.0 -



                                                                 3.0Prevention o

f



                                                                 recurrent embol

ism, or



                                                                 3.0 - 4.5 patie

nts with



                                                                 mechanical pros

thetic



                                                                 intravascular v

heard



IS PATIENT ON ANTICOAGULANTS ? YLIST ANTICOAGULANT/ANTI PLT MEDICATION : 
AspirinHas Lab been notified if Patient is on Heparin Drip? NOIf Yes, order CBC,
OCCULT BLOOD, PT every other day NTHROMBOPLASTIN TIME [id number] 12:09:00





             Test Item    Value        Reference Range Interpretation Comments

 

             PTT ACTIVATED (test 33.6 secs    26.6-34.6    N            Please n

ote new



             code = APTT)                                        normal range.



IS PATIENT ON ANTICOAGULANTS ? YLIST ANTICOAGULANT/ANTI PLT MEDICATION : 
AspirinHas Lab been notified if Patient is on Heparin Drip? NOIf Yes, order CBC,
OCCULT BLOOD, PT every other day NCOMPREHENSIVE METABOLIC [id number] 
11:49:00





             Test Item    Value        Reference Range Interpretation Comments

 

             SODIUM (test code = 141 mmol/L   136-145      N            



             NA)                                                 

 

             POTASSIUM (test code = 4.9 mmol/L   3.5-5.1      N            



             K)                                                  

 

             CHLORIDE (test code = 104.0 mmol/L        N            



             CL)                                                 

 

             CARBON DIOXIDE (test 28.3 mmol/L  21-32        N            



             code = CO2)                                         

 

             GLUCOSE (test code = 101 mg/dL           N            



             GLU)                                                

 

             BLOOD UREA NITROGEN 22 mg/dL     7-18         H            



             (test code = BUN)                                        

 

             GLOMERULAR FILTRATION 53.5         >60                       Unit o

f measure:



             RATE (test code = GFR)                                        mL/mi

n/1.73



                                                                 [id number]



                                                                 Range:Healthy



                                                                 Adults >90



                                                                 mL/min/1.73 m2 

For



                                                                 Chronic Kidney



                                                                 Disease: Stage 

II



                                                                 Mild Decrease i

n



                                                                 GFR 60-90 Stage

 III



                                                                 Moderate Decrea

se



                                                                 in GFR 30-59 St

age



                                                                 IV Severe Decre

ase



                                                                 in GFR 15-29 St

age



                                                                 V Kidney Failur

e



                                                                 <15

 

             CREATININE (test code 1.34 mg/dL   0.55-1.30    H            



             = CREAT)                                            

 

             TOTAL PROTEIN (test 7.3 g/dL     6.4-8.2      N            



             code = PROT)                                        

 

             ALBUMIN (test code = 4.2 g/dL     3.4-5.0      N            



             ALB)                                                

 

             GLOBULIN (test code = 3.1 g/dL     2.2-4.2      N            



             GLOB)                                               

 

             ALBUMIN/GLOBULIN RATIO 1.4          0.7-2.0      N            



             (test code = A/G)                                        

 

             CALCIUM (test code = 9.2 mg/dL    8.2-10.1     N            



             CA)                                                 

 

             BILIRUBIN TOTAL (test 0.60 mg/dL   0.2-1.00     N            



             code = BILT)                                        

 

             SGOT/AST (test code = 19.0 U/L     15-37        N            



             AST)                                                

 

             SGPT/ALT (test code = 34.0 U/L     12-78        N            Please

 note new



             ALT)                                                normal range.

 

             ALKALINE PHOSPHATASE 120 U/L             H            



             TOTAL (test code =                                        



             ALKP)                                               



CBC W/AUTO [id number] 10:46:00





             Test Item    Value        Reference Range Interpretation Comments

 

             WHITE BLOOD CELL (test code = WBC) 5.5 K/mm3    5.7-10.5     L     

       

 

             RED BLOOD CELL (test code = RBC) 5.09 M/mm3   4.2-5.4      N       

     

 

             HEMOGLOBIN (test code = HGB) 14.6 g/dL    12-16        N           

 

 

             HEMATOCRIT (test code = HCT) 44.1 %       37-47        N           

 

 

             MEAN CELL VOLUME (test code = MCV) 87 fL        80-98        N     

       

 

             MEAN CELL HGB (test code = MCH) 28.7 pg      27-34        N        

    

 

             MEAN CELL HGB CONCENTRATION (test 33.1 g/dL    30.8-34.1    N      

      



             code = MCHC)                                        

 

             RED CELL DISTRIBUTION WIDTH (test 13.1 %       11-16        N      

      



             code = RDW)                                         

 

             PLT (test code = PLT) 174 K/mm3    130-400      N            

 

             MEAN PLATELET VOLUME (test code = 11.3 fL      8.9-12.1     N      

      



             MPV)                                                

 

             NEUTROPHIL % (test code = NT%) 70.7 %       45-70        H         

   

 

             LYMPHOCYTE % (test code = LY%) 17.4 %       20-40        L         

   

 

             MONOCYTE % (test code = MO%) 10.1 %       3-10         H           

 

 

             EOSINOPHIL % (test code = EO%) 0.9 %        1-5          L         

   

 

             BASOPHIL % (test code = BA%) 0.4 %        0.0-1.1      N           

 

 

             NEUTROPHIL # (test code = NT#) 3.87 K/mm3   2.00-7.50    N         

   

 

             LYMPHOCYTE # (test code = LY#) 0.95 K/mm3   1.50-4.00    L         

   

 

             MONOCYTE # (test code = MO#) 0.55 K/mm3   0.2-0.8      N           

 

 

             EOSINOPHIL # (test code = EO#) 0.05 K/mm3   0.04-0.4     N         

   

 

             BASOPHIL # (test code = BA#) 0.02 K/mm3   0.02-0.10    N           

 

 

             MANUAL DIFF REQUIRED (test code = NO           MANUAL DIFF         

      



             MDIFF)                                              

 

             NUCLEATED RED BLOOD CELL (test 0 %          0-0          N         

   



             code = NRBC)